# Patient Record
Sex: FEMALE | URBAN - METROPOLITAN AREA
[De-identification: names, ages, dates, MRNs, and addresses within clinical notes are randomized per-mention and may not be internally consistent; named-entity substitution may affect disease eponyms.]

---

## 2019-12-02 ENCOUNTER — OFFICE VISIT (OUTPATIENT)
Dept: URBAN - METROPOLITAN AREA CLINIC 23 | Facility: CLINIC | Age: 69
End: 2019-12-02
Payer: COMMERCIAL

## 2019-12-02 DIAGNOSIS — H40.1434 BILATERAL CAPSULAR GLAUCOMA W/ PSEUDOEXFOLIATION OF LENS, INDETERMINATE STAGE: ICD-10-CM

## 2019-12-02 DIAGNOSIS — H04.123 DRY EYE SYNDROME OF BILATERAL LACRIMAL GLANDS: ICD-10-CM

## 2019-12-02 DIAGNOSIS — H01.009 BLEPHARITIS OF EYELID: ICD-10-CM

## 2019-12-02 DIAGNOSIS — H25.13 AGE-RELATED NUCLEAR CATARACT, BILATERAL: Primary | ICD-10-CM

## 2019-12-02 PROCEDURE — 99204 OFFICE O/P NEW MOD 45 MIN: CPT | Performed by: OPTOMETRIST

## 2019-12-02 ASSESSMENT — KERATOMETRY
OD: 42.50
OS: 42.63

## 2019-12-02 ASSESSMENT — INTRAOCULAR PRESSURE
OS: 16
OD: 20

## 2019-12-02 ASSESSMENT — VISUAL ACUITY
OS: 20/25
OD: 20/30

## 2019-12-02 NOTE — IMPRESSION/PLAN
Impression: Dry eye syndrome of bilateral lacrimal glands: H04.123. Plan: Discussed findings. Recommend AT with oil throughout the day. Monitor.

## 2019-12-02 NOTE — IMPRESSION/PLAN
Impression: Blepharitis of eyelid: H01.009. Plan: Discussed findings. Recommend lid hygiene with baby shampoo daily. Monitor. If it does not improve in 2-3 weeks, pt to return.

## 2019-12-02 NOTE — IMPRESSION/PLAN
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 Plan: continue latanoprost qhs OU as previously prescribed. IOP check in 6 months.

## 2019-12-02 NOTE — IMPRESSION/PLAN
Impression: Age-related nuclear cataract, bilateral: H25.13. Plan: Cataracts account for the patient's complaints. Small. No treatment currently recommended. The patient will monitor vision changes and contact us with any decrease in vision.

## 2021-08-25 ENCOUNTER — OFFICE VISIT (OUTPATIENT)
Dept: URBAN - METROPOLITAN AREA CLINIC 23 | Facility: CLINIC | Age: 71
End: 2021-08-25
Payer: COMMERCIAL

## 2021-08-25 DIAGNOSIS — H16.223 KERATOCONJUNCTIVITIS SICCA, NOT SPECIFIED AS SJÖGREN'S, BILATERAL: ICD-10-CM

## 2021-08-25 PROCEDURE — 99214 OFFICE O/P EST MOD 30 MIN: CPT | Performed by: OPTOMETRIST

## 2021-08-25 PROCEDURE — 92133 CPTRZD OPH DX IMG PST SGM ON: CPT | Performed by: OPTOMETRIST

## 2021-08-25 RX ORDER — LATANOPROST 50 UG/ML
0.005 % SOLUTION OPHTHALMIC
Qty: 7.5 | Refills: 6 | Status: ACTIVE
Start: 2021-08-25

## 2021-08-25 ASSESSMENT — KERATOMETRY
OS: 42.75
OD: 42.63

## 2021-08-25 ASSESSMENT — VISUAL ACUITY
OS: 20/25
OD: 20/30

## 2021-08-25 ASSESSMENT — INTRAOCULAR PRESSURE
OS: 20
OD: 20

## 2021-08-25 NOTE — IMPRESSION/PLAN
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 Plan: Discussed diagnosis in detail with patient. Discussed treatment options with patient. Reassured patient of current condition and treatment. Will continue to monitor IOP. Continue using current medication(Latanoprost) as directed.